# Patient Record
Sex: MALE | Race: OTHER
[De-identification: names, ages, dates, MRNs, and addresses within clinical notes are randomized per-mention and may not be internally consistent; named-entity substitution may affect disease eponyms.]

---

## 2020-06-24 ENCOUNTER — HOSPITAL ENCOUNTER (EMERGENCY)
Dept: HOSPITAL 61 - ER | Age: 25
Discharge: HOME | End: 2020-06-24
Payer: SELF-PAY

## 2020-06-24 VITALS — DIASTOLIC BLOOD PRESSURE: 65 MMHG | SYSTOLIC BLOOD PRESSURE: 122 MMHG

## 2020-06-24 VITALS — BODY MASS INDEX: 23.67 KG/M2 | HEIGHT: 70 IN | WEIGHT: 165.35 LBS

## 2020-06-24 DIAGNOSIS — J45.909: ICD-10-CM

## 2020-06-24 DIAGNOSIS — F17.200: ICD-10-CM

## 2020-06-24 DIAGNOSIS — L50.0: Primary | ICD-10-CM

## 2020-06-24 PROCEDURE — 96374 THER/PROPH/DIAG INJ IV PUSH: CPT

## 2020-06-24 PROCEDURE — 99284 EMERGENCY DEPT VISIT MOD MDM: CPT

## 2020-06-24 PROCEDURE — 96375 TX/PRO/DX INJ NEW DRUG ADDON: CPT

## 2020-06-24 NOTE — PHYS DOC
Past Medical History


Past Medical History:  Asthma


Smoking Status:  Current Some Day Smoker


Alcohol Use:  None





General Adult


EDM:


Chief Complaint:  ALLERGIC REACTION





HPI:


HPI:





Patient is a 24  year old male presenting to the ED with family with a chief 

complaint of allergic reaction.  Patient states that he had a banana smoothie 

about 30 minutes prior to arrival and he has rash all over his body.  Patient 

does state that he has a history of asthma.  Patient denies feeling like his 

airway is closing.  He denies swelling in his tongue or mouth.  Patient does 

complain of rash all over his body.  Patient does admit to being an active 

smoker.





Review of Systems:


Review of Systems:


Constitutional:   Denies fever or chills. []


Eyes:   Denies change in visual acuity. []


HENT:   Denies nasal congestion or sore throat. [] 


Respiratory:   Denies cough or shortness of breath. [] 


Cardiovascular:   Denies chest pain or edema. [] 


GI:   Denies abdominal pain, nausea, vomiting or diarrhea. [] 


:  Denies dysuria. [] 


Musculoskeletal:   Denies back pain or joint pain. [] 


Integument:   Complains of rash all over his body [] 


Neurologic:   Denies headache, focal weakness or sensory changes. []





Heart Score:


Risk Factors:


Risk Factors:  DM, Current or recent (<one month) smoker, HTN, HLP, family 

history of CAD, obesity.


Risk Scores:


Score 0 - 3:  2.5% MACE over next 6 weeks - Discharge Home


Score 4 - 6:  20.3% MACE over next 6 weeks - Admit for Clinical Observation


Score 7 - 10:  72.7% MACE over next 6 weeks - Early Invasive Strategies





Current Medications:





Current Medications








 Medications


  (Trade)  Dose


 Ordered  Sig/Monico  Start Time


 Stop Time Status Last Admin


Dose Admin


 


 Diphenhydramine


 HCl


  (Benadryl)  50 mg  1X  ONCE  6/24/20 12:15


 6/24/20 12:16 DC 6/24/20 12:15


50 MG


 


 Famotidine


  (Pepcid Vial)  20 mg  1X  ONCE  6/24/20 12:15


 6/24/20 12:16 DC 6/24/20 12:16


20 MG


 


 Methylprednisolone


 Sodium Succinate


  (SOLU-Medrol


 125MG VIAL)  125 mg  1X  ONCE  6/24/20 12:15


 6/24/20 12:16 DC 6/24/20 12:15


125 MG


 


 Sodium Chloride  1,000 ml @ 


 1,000 mls/hr  1X  ONCE  6/24/20 12:15


 6/24/20 13:14  6/24/20 12:15


1,000 MLS/HR











Allergies:


Allergies:





Allergies








Coded Allergies Type Severity Reaction Last Updated Verified


 


  No Known Drug Allergies    6/24/20 No











Physical Exam:


PE:





Constitutional: Well developed, well nourished, no acute distress, non-toxic 

appearance. []


HENT: Normocephalic, atraumatic


Eyes: EOMI


Neck: Normal range of motion, Supple


Cardiovascular:Heart rate regular rhythm


Lungs & Thorax:  Bilateral breath sounds clear to auscultation []


Abdomen: Bowel sounds normal, soft, no tenderness


Skin: Diffuse blanching hives all over his body


Extremities: No tenderness, ROM intact


Neurologic: Alert and oriented X 3





Current Patient Data:


Vital Signs:





                                   Vital Signs








  Date Time  Temp Pulse Resp B/P (MAP) Pulse Ox O2 Delivery O2 Flow Rate FiO2


 


6/24/20 12:00 97.9 105 16 142/92 (109) 96 Room Air  





 97.9       











EKG:


EKG:


[]





Radiology/Procedures:


Radiology/Procedures:


[]





Course & Med Decision Making:


Course & Med Decision Making








Patient was given Solu-Medrol 125 mg IV, Benadryl 25 mg IV, Pepcid 20 mg IV.





On recheck patient states that he is feeling better.


Patient still denies swelling in his throat.





Recheck patient after 1/2 hours of him being in the ER.  Patient states that the

 breathing is better.  The rash is resolved.


Patient and the family are comfortable to be discharged home.





Patient will be discharged home on steroids and Pepcid.





Discussed  results and plan of care with patient.


Patient is instructed to follow up with PCP in one to 2 days.


Appropriate discharge instructions given to patient to return to the ED or to 

seek immediate medical evaluation.


Patient is instructed to return to the ED if symptoms worsen or if any concerns.





Dragon Disclaimer:


Dragon Disclaimer:


This electronic medical record was generated, in whole or in part, using a voice

 recognition dictation system.





Departure


Departure


Impression:  


   Primary Impression:  


   Allergic reaction


   Additional Impression:  


   Hives


Disposition:  01 HOME, SELF-CARE


Condition:  STABLE


Referrals:  


NO PCP (PCP)


Patient Instructions:  Food Allergy, Hives





Additional Instructions:  


Discussed  results and plan of care with patient.


Patient is instructed to follow up with PCP in one to 2 days.


Appropriate discharge instructions given to patient to return to the ED or to 

seek immediate medical evaluation.


Patient is instructed to return to the ED if symptoms worsen or if any concerns.


Scripts


Famotidine (PEPCID) 20 Mg Tablet


20 MG PO DAILY, #5 TAB


   Prov: DEDE BRODY DO         6/24/20 


Prednisone (PREDNISONE) 20 Mg Tablet


2 TAB PO DAILY for 5 Days, #10 TAB


   Prov: DEDE BRODY DO         6/24/20





Justicifation of Admission Dx:


Justifications for Admission:


Justification of Admission Dx:  No











DEDE BRODY DO           Jun 24, 2020 12:55

## 2020-08-20 ENCOUNTER — HOSPITAL ENCOUNTER (EMERGENCY)
Dept: HOSPITAL 61 - ER | Age: 25
Discharge: HOME | End: 2020-08-20
Payer: SELF-PAY

## 2020-08-20 VITALS — SYSTOLIC BLOOD PRESSURE: 115 MMHG | DIASTOLIC BLOOD PRESSURE: 59 MMHG

## 2020-08-20 VITALS — WEIGHT: 170.2 LBS | HEIGHT: 69 IN | BODY MASS INDEX: 25.21 KG/M2

## 2020-08-20 DIAGNOSIS — R05: ICD-10-CM

## 2020-08-20 DIAGNOSIS — J45.909: ICD-10-CM

## 2020-08-20 DIAGNOSIS — Z90.89: ICD-10-CM

## 2020-08-20 DIAGNOSIS — J06.9: ICD-10-CM

## 2020-08-20 DIAGNOSIS — G43.009: ICD-10-CM

## 2020-08-20 DIAGNOSIS — U07.1: Primary | ICD-10-CM

## 2020-08-20 DIAGNOSIS — R42: ICD-10-CM

## 2020-08-20 DIAGNOSIS — R07.89: ICD-10-CM

## 2020-08-20 LAB
ALBUMIN SERPL-MCNC: 3.7 G/DL (ref 3.4–5)
ALBUMIN/GLOB SERPL: 1 {RATIO} (ref 1–1.7)
ALP SERPL-CCNC: 89 U/L (ref 46–116)
ALT SERPL-CCNC: 42 U/L (ref 16–63)
ANION GAP SERPL CALC-SCNC: 9 MMOL/L (ref 6–14)
AST SERPL-CCNC: 29 U/L (ref 15–37)
BASOPHILS # BLD AUTO: 0 X10^3/UL (ref 0–0.2)
BASOPHILS NFR BLD: 1 % (ref 0–3)
BILIRUB SERPL-MCNC: 0.2 MG/DL (ref 0.2–1)
BUN SERPL-MCNC: 11 MG/DL (ref 8–26)
BUN/CREAT SERPL: 11 (ref 6–20)
CALCIUM SERPL-MCNC: 8.8 MG/DL (ref 8.5–10.1)
CHLORIDE SERPL-SCNC: 103 MMOL/L (ref 98–107)
CO2 SERPL-SCNC: 27 MMOL/L (ref 21–32)
CREAT SERPL-MCNC: 1 MG/DL (ref 0.7–1.3)
EOSINOPHIL NFR BLD: 0.1 X10^3/UL (ref 0–0.7)
EOSINOPHIL NFR BLD: 2 % (ref 0–3)
ERYTHROCYTE [DISTWIDTH] IN BLOOD BY AUTOMATED COUNT: 13.3 % (ref 11.5–14.5)
GFR SERPLBLD BASED ON 1.73 SQ M-ARVRAT: 93.4 ML/MIN
GLOBULIN SER-MCNC: 3.8 G/DL (ref 2.2–3.8)
GLUCOSE SERPL-MCNC: 80 MG/DL (ref 70–99)
HCT VFR BLD CALC: 45.2 % (ref 39–53)
HGB BLD-MCNC: 15.5 G/DL (ref 13–17.5)
LIPASE: 107 U/L (ref 73–393)
LYMPHOCYTES # BLD: 0.9 X10^3/UL (ref 1–4.8)
LYMPHOCYTES NFR BLD AUTO: 9 % (ref 24–48)
MCH RBC QN AUTO: 31 PG (ref 25–35)
MCHC RBC AUTO-ENTMCNC: 34 G/DL (ref 31–37)
MCV RBC AUTO: 92 FL (ref 79–100)
MONO #: 0.7 X10^3/UL (ref 0–1.1)
MONOCYTES NFR BLD: 8 % (ref 0–9)
NEUT #: 7.6 X10^3/UL (ref 1.8–7.7)
NEUTROPHILS NFR BLD AUTO: 81 % (ref 31–73)
PLATELET # BLD AUTO: 318 X10^3/UL (ref 140–400)
POTASSIUM SERPL-SCNC: 3.6 MMOL/L (ref 3.5–5.1)
PROT SERPL-MCNC: 7.5 G/DL (ref 6.4–8.2)
RBC # BLD AUTO: 4.94 X10^6/UL (ref 4.3–5.7)
SODIUM SERPL-SCNC: 139 MMOL/L (ref 136–145)
WBC # BLD AUTO: 9.4 X10^3/UL (ref 4–11)

## 2020-08-20 PROCEDURE — 93005 ELECTROCARDIOGRAM TRACING: CPT

## 2020-08-20 PROCEDURE — 99285 EMERGENCY DEPT VISIT HI MDM: CPT

## 2020-08-20 PROCEDURE — 83690 ASSAY OF LIPASE: CPT

## 2020-08-20 PROCEDURE — 85379 FIBRIN DEGRADATION QUANT: CPT

## 2020-08-20 PROCEDURE — 87880 STREP A ASSAY W/OPTIC: CPT

## 2020-08-20 PROCEDURE — 36415 COLL VENOUS BLD VENIPUNCTURE: CPT

## 2020-08-20 PROCEDURE — 80053 COMPREHEN METABOLIC PANEL: CPT

## 2020-08-20 PROCEDURE — 96366 THER/PROPH/DIAG IV INF ADDON: CPT

## 2020-08-20 PROCEDURE — 96365 THER/PROPH/DIAG IV INF INIT: CPT

## 2020-08-20 PROCEDURE — 96375 TX/PRO/DX INJ NEW DRUG ADDON: CPT

## 2020-08-20 PROCEDURE — 85025 COMPLETE CBC W/AUTO DIFF WBC: CPT

## 2020-08-20 PROCEDURE — 87070 CULTURE OTHR SPECIMN AEROBIC: CPT

## 2020-08-20 PROCEDURE — 71045 X-RAY EXAM CHEST 1 VIEW: CPT

## 2020-08-20 PROCEDURE — 84484 ASSAY OF TROPONIN QUANT: CPT

## 2020-08-20 NOTE — RAD
INDICATION: Reason: CP / Spl. Instructions:  / History: 

 

COMPARISON: None.

 

FINDINGS:

 

Single view of chest obtained.

Cardiac silhouette is near the upper limits of normal in size. No definite

focal consolidation or edema

 

 

IMPRESSION:

 

*  No focal airspace consolidation or edema.

 

Electronically signed by: Devang Bravo MD (8/20/2020 5:48 AM) 

[id number]

## 2020-08-20 NOTE — PHYS DOC
Past Medical History


Past Medical History:  Asthma


 (FRANCOISE YU MD)


Past Surgical History:  Tonsillectomy


 (FRANCOISE YU MD)


Smoking Status:  Never Smoker


Alcohol Use:  Occasionally


 (FRANCOISE YU MD)





General Adult


EDM:


Chief Complaint:  MULTIPLE COMPLAINTS





HPI:


HPI:





Patient is a 24  year old male who presents with multiple complaints.  Patient 

stated that he [place] at the mall on Wednesday and he got dizzy which he described 

as presyncopal.  He never did pass out and noted that he just kind of felt 

drained.  He went home and took a nap and when he woke up he noted that he 

started to have a cough.  In addition he woke up with a sore throat described as

a burning sensation as well as a burning sensation in his chest.  Patient 

reports he has had some stomach upset in the last couple of days similar to what

he is having now with mild nausea.  Patient and reports that the chest pain is 

constant has been present since 8:00 yesterday and seems to wax and wane.  

Patient denied shortness of breath but says that he feels like he cannot take a 

deep breath at times.  He denied any vomiting but complained of nausea.  He 

complained of a headache that was bilateral so, throbbing, associated with 

photophobia and nausea.  Patient reports he has had headaches like this in the 

past.  No melena or hematochezia, denies change in urination or any neurological

changes.


 (FRANCOISE YU MD)





Review of Systems:


Review of Systems:


Constitutional:   Denies fever or chills. []


Eyes:   Denies change in visual acuity. []


HENT:   See HPI [] 


Respiratory:   See HPI. [] 


Cardiovascular:   See HPI. [] 


GI: See HPI. [] 


:  Denies dysuria. [] 


Musculoskeletal:   Denies back pain or joint pain. [] 


Integument:   Denies rash. [] 


Neurologic:   Denies focal weakness or sensory changes. [] 


Endocrine:   Denies polyuria or polydipsia. [] 


Lymphatic:  Denies swollen glands. [] 


Psychiatric:  Denies depression or anxiety. []


 (FRANCOISE YU MD)





Heart Score:


Risk Factors:


Risk Factors:  DM, Current or recent (<one month) smoker, HTN, HLP, family 

history of CAD, obesity.


Risk Scores:


Score 0 - 3:  2.5% MACE over next 6 weeks - Discharge Home


Score 4 - 6:  20.3% MACE over next 6 weeks - Admit for Clinical Observation


Score 7 - 10:  72.7% MACE over next 6 weeks - Early Invasive Strategies


 (FRANCOISE YU MD)





Current Medications:





Current Medications








 Medications


  (Trade)  Dose


 Ordered  Sig/Monico  Start Time


 Stop Time Status Last Admin


Dose Admin


 


 Al Hydroxide/Mg


 Hydroxide


  (Mylanta Plus Xs)  30 ml  1X  ONCE  20 05:30


 20 05:31 DC  





 


 Diphenhydramine


 HCl


  (Benadryl)  25 mg  1X  ONCE  20 05:30


 20 05:31 DC  





 


 Famotidine


  (Pepcid)  20 mg  1X  ONCE  20 05:30


 20 05:31 DC  





 


 Ketorolac


 Tromethamine


  (Toradol 30mg


 Vial)  30 mg  1X  ONCE  20 05:30


 20 05:31 DC  





 


 Magnesium Sulfate/


 Dextrose  100 ml @ 


 100 mls/hr  1X  ONCE  20 05:30


 20 06:29   





 


 Metoclopramide HCl


  (Reglan Vial)  10 mg  1X  ONCE  20 05:30


 20 05:31 DC  





 


 Sodium Chloride  500 ml @ 


 500 mls/hr  1X  ONCE  20 05:30


 20 06:29   











 (FRANCOISE YU MD)





Allergies:


Allergies:





Allergies








Coded Allergies Type Severity Reaction Last Updated Verified


 


  No Known Drug Allergies    20 No








 (FRANCOISE YU MD)





Physical Exam:


PE:





Constitutional: Well developed, well nourished, no acute distress, non-toxic 

appearance. []


HENT: Normocephalic, atraumatic, bilateral external ears normal, oropharynx 

moist, no oral exudates, nose normal. []


Eyes: PERRLA, EOMI, conjunctiva normal, no discharge. [] 


Neck: Normal range of motion, no tenderness, supple, no stridor. [] 


Cardiovascular:Heart rate regular rhythm, no murmur []


Lungs & Thorax:  Bilateral breath sounds clear to auscultation []


Abdomen: Bowel sounds normal, soft, no tenderness, no masses, no pulsatile 

masses. [] 


Skin: Warm, dry, no erythema, no rash. [] 


Back: No tenderness, no CVA tenderness. [] 


Extremities: No tenderness, no cyanosis, no clubbing, ROM intact, no edema. [] 


Neurologic: Alert and oriented X 3, normal motor function, normal sensory 

function, no focal deficits noted. []


Psychologic: Affect normal, judgement normal, mood normal. []


 (FRANCOISE YU MD)





Current Patient Data:


Vital Signs:





                                   Vital Signs








  Date Time  Temp Pulse Resp B/P (MAP) Pulse Ox O2 Delivery O2 Flow Rate FiO2


 


20 04:20 99.9 88 20 138/83 (101) 100 Room Air  





 99.9       








 (FRANCOISE YU MD)





EKG:


EKG:


[]


 (FRANCOISE YU MD)


EKG:


Sinus rhythm at 69 bpm, no axis deviation, normal intervals, no T wave 

inversions, no ST elevations or ST depressions


 (SHANON ESCALONA DO)


Radiology/Procedures:


Radiology/Procedures:


[]


 (FRANCOISE YU MD)


Radiology/Procedures:


                                        


                                 IMAGING REPORT





                                     Signed





PATIENT: SCOTT ZUÑIGA  ACCOUNT: [id number]     MRN#: [id number]


: 1995           LOCATION: ER              AGE: 24


SEX: M                    EXAM DT: 20         ACCESSION#: [id number]


STATUS: REG ER            ORD. PHYSICIAN: FRANCOISE YU MD


REASON: CP


PROCEDURE: CHEST AP ONLY





 


INDICATION: Reason: CP / Spl. Instructions:  / History: 


 


COMPARISON: None.


 


FINDINGS:


 


Single view of chest obtained.


Cardiac silhouette is near the upper limits of normal in size. No definite


focal consolidation or edema


 


 


IMPRESSION:


 


*  No focal airspace consolidation or edema.


 


Electronically signed by: David Burton MD (2020 5:48 AM) 


[id number]














DICTATED and SIGNED BY:     DAVID BURTON MD


DATE:     20 0548


 (SHANON ESCALONA DO)


Course & Med Decision Making:


Course & Med Decision Making


Pertinent Labs and Imaging studies reviewed. (See chart for details)





[]


 (FRANCOISE YU MD)


Course & Med Decision Making


Concern for atypical chest pain and migraine headache with sore throat and dry 

cough x2 days. Lungs clear, no pharyngeal erythema or exudates.  Patient states 

his headache and chest pain is almost fully resolved.  Labs with no 

leukocytosis.  Chemistry including a troponin (sxs present for > 6hrs) are 

negative. D-dimer wnl. CXR with no focal consolidation. Covid test pending.  

Rapid strep negative.  Will DC home with conservative measures, Tylenol, NSAIDs 

rest and hydration.  Work note will be provided for 2 days.  Strict ED return 

precautions given for respiratory distress, severe chest pain or dehydration.  

Encouraged urgent outpatient follow-up with PMD.  Life-threatening processes 

were considered but are low suspicion at this time, given history and physical 

exam. Pt was educated on all prescription medications and adverse effects.  All 

patient's questions were answered and pt was stable at time of discharge.





Differential includes acute myocardial infarction, aortic dissection, congestive

 heart failure, esophageal injury including rupture, surgical abdomen, 

arrhythmia, cardiomyopathy, myocarditis, pericarditis, peptic ulcer disease, 

pneumomediastinum, pneumonia, pneumothorax, pulmonary embolus, unstable angina, 

rib fracture, contusion, pericardial tamponade or effusion, pulmonary contusion,

 ashley's angina, peritonsillar abscess, retropharyngeal abscess, epiglottitis, 

bacterial tracheitis, uvulitis, sepsis, mastoiditis, traumatic injury.





I spoken with the patient and her caregivers.  I explained the patient's 

condition, diagnoses and treatment plan based on the information available to me

 at this time.  I have answered the patient and her caregiver's questions and 

addressed any concerns.  The patient and her caregivers have a good 

understanding of patient's diagnosis, condition and treatment plan as can be 

expected at this point.  Vital signs have been stable.  Patient's condition is 

stable and appropriate for discharge from the emergency department. 





Patient will pursue further outpatient evaluation with primary care physician or

 other designated or consulting physician as outlined in the discharge 

instructions.  The patient and/or caregivers are agreeable to this plan of care 

and follow-up instructions have been explained in detail.  The patient and/or 

caregivers have received these instructions in written form and have expressed 

an understanding of the discharge instructions.  The patient and/or caregivers 

are aware that any significant change of condition or worsening of symptoms 

should prompt immediate return to this or the closest emergency department or 

call to 911.


 (SHANON FALCON DO)


Dragon Disclaimer:


Dragon Disclaimer:


This electronic medical record was generated, in whole or in part, using a voice

 recognition dictation system.


 (FRANCOISE YU MD)





Departure


Departure


Impression:  


   Primary Impression:  


   Non-cardiac chest pain


   Additional Impressions:  


   Common migraine


   Qualified Codes:  G43.009 - Migraine without aura, not intractable, without 

   status migrainosus


   URI (upper respiratory infection)


Disposition:   HOME, SELF-CARE


Condition:  STABLE


Referrals:  


NO PCP (PCP)


Patient Instructions:  Chest Pain (Nonspecific), Migraine Headache, Upper 

Respiratory Infection, Adult





Additional Instructions:  


Dr. Saurav Michelle


[address], [address]


[place] [address]


Phone:


[phone]





Justicifation of Admission Dx:


Justifications for Admission:


Justification of Admission Dx:  N/A


 (FRANCOISE YU MD)


Justification of Admission Dx:  N/A


 (SHANON ESCALONA DO)











FRANCOISE YU MD          Aug 20, 2020 05:37


SHANON ESCALONA DO               Aug 20, 2020 06:45

## 2020-08-20 NOTE — EKG
[facility]

              [address] [place] [address]

Test Date:    2020               Test Time:    05:56:23

Pat Name:     SCOTT ZUÑIGA          Department:   

Patient ID:   [id number]           Room:          

Gender:       M                        Technician:   

:          1995               Requested By: FRANCOISE YU

Order Number: [id number]           Reading MD:     

                                 Measurements

Intervals                              Axis          

Rate:         69                       P:            45

KY:           130                      QRS:          48

QRSD:         88                       T:            46

QT:           322                                    

QTc:          350                                    

                           Interpretive Statements

SINUS RHYTHM

NORMAL ECG

RI6.01

No previous ECG available for comparison

## 2020-08-21 NOTE — NUR
IP: Pt returned call. Informed him of + COVID results and need to self quarantine for 14 
days. Pt verbalized understanding.

## 2021-02-08 ENCOUNTER — HOSPITAL ENCOUNTER (EMERGENCY)
Dept: HOSPITAL 61 - ER | Age: 26
Discharge: HOME | End: 2021-02-08
Payer: SELF-PAY

## 2021-02-08 VITALS — DIASTOLIC BLOOD PRESSURE: 84 MMHG | SYSTOLIC BLOOD PRESSURE: 147 MMHG

## 2021-02-08 VITALS — WEIGHT: 170.42 LBS | HEIGHT: 69 IN | BODY MASS INDEX: 25.24 KG/M2

## 2021-02-08 DIAGNOSIS — F19.10: ICD-10-CM

## 2021-02-08 DIAGNOSIS — J45.909: ICD-10-CM

## 2021-02-08 DIAGNOSIS — F41.8: Primary | ICD-10-CM

## 2021-02-08 DIAGNOSIS — Z90.89: ICD-10-CM

## 2021-02-08 LAB
AMPHETAMINE/METHAMPHETAMINE: (no result)
BARBITURATES UR-MCNC: (no result) UG/ML
BENZODIAZ UR-MCNC: (no result) UG/L
CANNABINOIDS UR-MCNC: (no result) UG/L
COCAINE UR-MCNC: (no result) NG/ML
METHADONE SERPL-MCNC: (no result) NG/ML
OPIATES UR-MCNC: (no result) NG/ML
PCP SERPL-MCNC: (no result) MG/DL

## 2021-02-08 PROCEDURE — 93005 ELECTROCARDIOGRAM TRACING: CPT

## 2021-02-08 PROCEDURE — 80307 DRUG TEST PRSMV CHEM ANLYZR: CPT

## 2021-02-08 PROCEDURE — 99284 EMERGENCY DEPT VISIT MOD MDM: CPT

## 2021-02-08 NOTE — EKG
[facility]

              [address] [place] [address]

Test Date:    2021               Test Time:    06:40:47

Pat Name:     SCOTT ZUÑIGA          Department:   

Patient ID:   [id number]           Room:          

Gender:       M                        Technician:   

:          1995               Requested By: SHANON ESCALONA

Order Number: [id number]           Reading MD:   Savage Goss

                                 Measurements

Intervals                              Axis          

Rate:         76                       P:            57

AK:           126                      QRS:          48

QRSD:         94                       T:            54

QT:           360                                    

QTc:          409                                    

                           Interpretive Statements

SINUS RHYTHM

Electronically Signed On 2021 9:03:20 CST by Savage Goss

## 2021-02-08 NOTE — PHYS DOC
Past Medical History


Past Medical History:  Asthma


Past Surgical History:  Tonsillectomy


Smoking Status:  Never Smoker


Alcohol Use:  Occasionally





General Adult


EDM:


Chief Complaint:  OTHER COMPLAINTS





HPI:


HPI:


25-year-old male who denies any stated past medical history presents the ED with

multiple complaints.  Patient states around 1 AM on Sunday he drank half bottle 

of whiskey, smoked a line of cocaine and smoked 11 cigarettes.  States he fell 

asleep around 2 PM on Sunday and woke up a few hours ago.  Planes of left arm 

pain, heart racing, tingling in his hands, shaking of UEs, hot face, ringing in 

his ears and states "I think it is all my head, I cannot get the thoughts to 

stop going fast, I can't calm down."  Reports increased stress, lost his cousin 

this past week to a motorcycle accident.  States he has been taking Tylenol PM 

for the past week ( 1 tablet, no additional apap).  Reports associated decreased

appetite.  No associated nausea, vomiting, diarrhea or fluid losses.  No prior 

history of anxiety.  No suicidal or homicidal ideations, no hallucinations.





Review of Systems:


Review of Systems:


Constitutional:   Denies fever or chills. []


Eyes:   Denies change in visual acuity. []


HENT:   Denies nasal congestion or sore throat. [] 


Respiratory:   Denies cough or shortness of breath. [] 


Cardiovascular:   Denies chest pressure or edema. [] 


GI:   Denies abdominal pain, nausea, vomiting, bloody stools or diarrhea. [] 


:  Denies dysuria. [] 


Musculoskeletal:   Denies back pain or joint pain. [] 


Integument:   Denies rash. [] 


Neurologic:   Denies headache, focal weakness or sensory changes. [] 


Endocrine:   Denies polyuria or polydipsia. [] 


Lymphatic:  Denies swollen glands. [] 


Psychiatric:  Denies depression or anxiety. []





Heart Score:


Risk Factors:


Risk Factors:  DM, Current or recent (<one month) smoker, HTN, HLP, family 

history of CAD, obesity.


Risk Scores:


Score 0 - 3:  2.5% MACE over next 6 weeks - Discharge Home


Score 4 - 6:  20.3% MACE over next 6 weeks - Admit for Clinical Observation


Score 7 - 10:  72.7% MACE over next 6 weeks - Early Invasive Strategies





Current Medications:





Current Medications








 Medications


  (Trade)  Dose


 Ordered  Sig/Monico  Start Time


 Stop Time Status Last Admin


Dose Admin


 


 Lorazepam


  (Ativan)  0.25 mg  1X  ONCE  2/8/21 06:30


 2/8/21 06:31   














Allergies:


Allergies:





Allergies








Coded Allergies Type Severity Reaction Last Updated Verified


 


  No Known Drug Allergies    6/24/20 No











Physical Exam:


PE:





Constitutional: Well developed, well nourished, no acute distress, non-toxic 

appearance. 


HENT: Normocephalic, atraumatic, dry mucous membranes


Eyes: Mydriatic pupils, EOMI, conjunctiva normal, no discharge.  


Neck: Normal range of motion,  supple, 


Cardiovascular: S1/2 present, regular rhythm


Lungs & Thorax: Speaking in full sentences, bilateral equal chest rise, no 

tachypnea or increased work of breathing


Abdomen:  soft, no tenderness, 


Skin: Warm, dry, no erythema, no rash. [] 


Extremities: No tenderness, no cyanosis, no edema, tremulous


Neurologic: Alert and oriented X 3, normal motor function, normal sensory 

function, no focal deficits noted. []


Psychologic: Affect normal, judgement normal, mood -very anxious, easily 

emotional,





EKG:


EKG:


Sinus rhythm at 76 bpm, no axis deviation, normal intervals, no T wave 

inversions, no ST elevations or ST depressions, patient with no chest pressure, 

heaviness or tightness, nausea, vomiting, diaphoresis/or jaw pain -patient last 

used cocaine 16 hours ago





Radiology/Procedures:


Radiology/Procedures:


[]





Course & Med Decision Making:


Course & Med Decision Making


Pertinent Labs and Imaging studies reviewed. (See chart for details)





Concern for anxiety versus panic attack versus substance induced mood disorder. 

 Patient hemodynamically stable with no trauma, not a danger to himself.  

Reports his friend will drive him home.  Was given Ativan orally in ED to help 

with anxiety.  Will prescribe Atarax.  Was discouraged substance and alcohol 

abuse that can alter patient's mood.  Encourage physical activity and to avoid 

any caffeine or stimulants.  Will discharge home with strict ED return 

precautions were given for syncope, severe pain, SI, HI or hallucinations. 

Encouraged urgent outpatient follow-up with PMD and psychiatry for further 

outpatient care.  Life-threatening processes were considered but are low 

suspicion at this time, given history, physical exam and ED workup. Pt was 

educated on all prescription medications and adverse effects.  All patient's 

questions were answered and pt was stable at time of discharge.





Life/limb-threatening differential includes but is not limited to, end organ 

damage/sepsis, trauma/abuse/neglect, neurologic deficit, alcohol/drug ingestion,

 toxidrome, suicidal/homicidal ideations plans or attempts, psychosis or mental 

illness resulting in self neglect and inability to care for self.





I spoken with the patient and her caregivers.  I explained the patient's 

condition, diagnoses and treatment plan based on the information available to me

 at this time.  I have answered the patient and her caregiver's questions and 

addressed any concerns.  The patient and her caregivers have a good 

understanding of patient's diagnosis, condition and treatment plan as can be 

expected at this point.  Vital signs have been stable.  Patient's condition is 

stable and appropriate for discharge from the emergency department. 





Patient will pursue further outpatient evaluation with primary care physician or

 other designated or consulting physician as outlined in the discharge 

instructions.  The patient and/or caregivers are agreeable to this plan of care 

and follow-up instructions have been explained in detail.  The patient and/or 

caregivers have received these instructions in written form and have expressed 

an understanding of the discharge instructions.  The patient and/or caregivers 

are aware that any significant change of condition or worsening of symptoms 

should prompt immediate return to this or the closest emergency department or 

call to 1.





Yariel Disclaimer:


Dragon Disclaimer:


This electronic medical record was generated, in whole or in part, using a voice

 recognition dictation system.





Departure


Departure


Impression:  


   Primary Impression:  


   Anxiety


   Additional Impression:  


   Polysubstance abuse


Disposition:  01 DC HOME SELF CARE/HOMELESS


Condition:  STABLE


Referrals:  


NO PCP (PCP)


FOLLOW UP WITH FAMILY MEDICINE:





Family Medicine


Address:


[address]


[place] [address]


Phone:


[phone]


Patient Instructions:  Anxiety and Panic Attacks, Mood Disorders, Substance 

Abuse-Brief





Additional Instructions:  


FOLLOW UP WITH PSYCHIATRY:





Dr. Juan Alberto Biswas


Psychiatry Specialist


[address], [place] [address]


Phone: [phone]








EMERGENCY DEPARTMENT GENERAL DISCHARGE INSTRUCTIONS





Thank you for coming to [facility] Emergency Department (ED) 

today and 


trusting us with you care.  We trust that you had a positive experience in our 

Emergency 


Department.  If you wish to speak to the department management, you may call the

 Director at 


[phone].





YOUR FOLLOW UP INSTRUCTIONS ARE AS FOLLOWS:





1.  Do you have a private Doctor?  If you do not have a private doctor, please 

ask for a 


resource list of physicians or clinics that may be able to assist you with 

follow up care.





2.  The Emergency Physicain has interpreted your x-rays.  The X-Ray specialist 

will also 


review them.  If there is a change in the findings, you will be notified in 48 

hours when at 


all possible.





3.  A lab test or culture has been done, your results will be reviewed and you 

will be 


notified if you need a change in treatment.





ADDITIONAL INSTRUCTIONS AND INFORMATION:





1.  Your care today has been supervised by a physician who is specially trained 

in emergency 


care.  Many problems require more than one evaluation for a complete diagnosis 

and 


treatment.  We recommend that you schedule your follow up appointment as 

recommended to 


ensure complete treatment of you illness or injury.  If you are unable to obtain

 follow up 


care and continue to have a problem, or if your condition worsens, we recommend 

that you 


return to the ED.





2.  We are not able to safely determine your condition over the phone nor are we

 able to 


give sound medical advice over the phone.  For these safety reasons, if you call

 for medical 


advice we will ask you to come to the ED for further evaluation.





3.  If you have any questions regarding these discharge instructions please call

 the ED at 


[phone].





SAFETY INFORMATION:





In the interest of safety, wellness, and injury prevention; we encourage you to 

wear your 


sealbelt, if you smoke; quite smoking, and we encourage family to use a prote

ctive helmet 


for bicycling and other sporting events that present an increased risk for head 

injury.





IF YOUR SYMPTOMS WORSEN OR NEW SYMPTOMS DEVELOP, OR YOU HAVE CONCERNS ABOUT YOUR

 CONDITION; 


OR IF YOUR CONDITION WORSENS WHILE YOU ARE WAITING FOR YOUR FOLLOW UP 

APPOINTMENT; EITHER 


CONTACT YOUR PRIMARY CARE DOCTOR, THE PHYSICIAN WHOSE NAME AND NUMBER YOU WERE 

GIVEN, OR 


RETURN TO THE ED IMMEDIATELY.


Scripts


Hydroxyzine Hcl (HYDROXYZINE HCL) 25 Mg Tablet


1 TAB PO TID PRN for itching, #20 TAB


   Prov: SHANON ESCALONA DO         2/8/21











SHANON ESCALONA DO                Feb 8, 2021 06:25